# Patient Record
Sex: FEMALE | Race: WHITE
[De-identification: names, ages, dates, MRNs, and addresses within clinical notes are randomized per-mention and may not be internally consistent; named-entity substitution may affect disease eponyms.]

---

## 2017-10-06 ENCOUNTER — HOSPITAL ENCOUNTER (EMERGENCY)
Dept: HOSPITAL 61 - ER | Age: 22
LOS: 1 days | Discharge: HOME | End: 2017-10-07
Payer: COMMERCIAL

## 2017-10-06 VITALS — BODY MASS INDEX: 44.41 KG/M2 | WEIGHT: 293 LBS | HEIGHT: 68 IN

## 2017-10-06 DIAGNOSIS — E66.3: ICD-10-CM

## 2017-10-06 DIAGNOSIS — R11.2: Primary | ICD-10-CM

## 2017-10-06 DIAGNOSIS — Z88.2: ICD-10-CM

## 2017-10-06 DIAGNOSIS — M54.5: ICD-10-CM

## 2017-10-06 DIAGNOSIS — E11.9: ICD-10-CM

## 2017-10-06 DIAGNOSIS — R19.7: ICD-10-CM

## 2017-10-06 LAB
BACTERIA #/AREA URNS HPF: 0 /HPF
BILIRUB UR QL STRIP: NEGATIVE
GLUCOSE UR STRIP-MCNC: NEGATIVE MG/DL
NITRITE UR QL STRIP: NEGATIVE
PH UR STRIP: 6 [PH]
PROT UR STRIP-MCNC: 30 MG/DL
RBC #/AREA URNS HPF: >40 /HPF (ref 0–2)
SP GR UR STRIP: >=1.03
SQUAMOUS #/AREA URNS LPF: (no result) /LPF
UROBILINOGEN UR-MCNC: 0.2 MG/DL
WBC #/AREA URNS HPF: (no result) /HPF (ref 0–4)

## 2017-10-06 PROCEDURE — 81001 URINALYSIS AUTO W/SCOPE: CPT

## 2017-10-06 PROCEDURE — 81025 URINE PREGNANCY TEST: CPT

## 2017-10-06 PROCEDURE — 99285 EMERGENCY DEPT VISIT HI MDM: CPT

## 2017-10-06 PROCEDURE — 74176 CT ABD & PELVIS W/O CONTRAST: CPT

## 2017-10-06 NOTE — PHYS DOC
Adult General


Chief Complaint


Chief Complaint:  NAUSEA/VOMITING/DIARRHA





HPI


HPI





Patient is a 21  year old female with history of diabetes type 2 who presents 

today with nausea vomiting and slight diarrhea and right low back pain that 

began 3 days ago. Patient denies any fever. Denies any hematemesis or melena.





Review of Systems


Review of Systems





Constitutional: Denies fever or chills []


Eyes: Denies change in visual acuity, redness, or eye pain []


HENT: Denies nasal congestion or sore throat []


Respiratory: Denies cough or shortness of breath []


Cardiovascular: No additional information not addressed in HPI []


GI:  abdominal pain, nausea, vomiting, slight diarrhea []


: Denies dysuria or hematuria []


Musculoskeletal: Denies back pain or joint pain []


Integument: Denies rash or skin lesions []


Neurologic: Denies headache, focal weakness or sensory changes []


Endocrine: Denies polyuria or polydipsia []





Current Medications


Current Medications





Current Medications








 Medications


  (Trade)  Dose


 Ordered  Sig/Ra  Start Time


 Stop Time Status Last Admin


Dose Admin


 


 Acetaminophen/


 Hydrocodone Bitart


  (Lortab 5/325)  1 tab  1X  ONCE  10/6/17 23:00


 10/6/17 23:01 DC 10/6/17 23:31


1 TAB


 


 Cyclobenzaprine


 HCl


  (Flexeril)  10 mg  1X  ONCE  10/6/17 23:00


 10/6/17 23:01 DC 10/6/17 23:31


10 MG


 


 Dicyclomine HCl


  (Bentyl)  20 mg  1X  ONCE  10/6/17 23:00


 10/6/17 23:01 DC 10/6/17 23:30


20 MG


 


 Ondansetron HCl


  (Zofran Odt)  4 mg  1X  ONCE  10/6/17 23:00


 10/6/17 23:01 DC 10/6/17 23:30


4 MG











Allergies


Allergies





Allergies








Coded Allergies Type Severity Reaction Last Updated Verified


 


  Sulfa (Sulfonamide Antibiotics) Allergy Unknown  10/6/17 Yes











Physical Exam


Physical Exam





Constitutional: Well developed, well nourished, no acute distress, non-toxic 

appearance. []


HENT: Normocephalic, atraumatic, bilateral external ears normal, oropharynx 

moist, no oral exudates, nose normal. []


Eyes: PERRLA, EOMI, conjunctiva normal, no discharge. [] 


Neck: Normal range of motion, no tenderness, supple, no stridor. [] 


Cardiovascular:Heart rate regular rhythm, no murmur []


Lungs & Thorax:  Bilateral breath sounds clear to auscultation []


Abdomen: Overweight patient. Bowel sounds normal, soft, no tenderness, no masses

, no pulsatile masses. [] 


Skin: Warm, dry, no erythema, no rash. [] 


Back: Slight paraspinal muscle tenderness to the right lumbar spine, no midline 

lumbar spine tenderness, no CVA tenderness. [] 


Extremities: No tenderness, no cyanosis, no clubbing, ROM intact, no edema. [] 


Neurologic: Alert and oriented X 3, normal motor function, normal sensory 

function, no focal deficits noted. []


Psychologic: Affect normal, judgement normal, mood normal. []





Current Patient Data


Vital Signs





 Vital Signs








  Date Time  Temp Pulse Resp B/P (MAP) Pulse Ox O2 Delivery O2 Flow Rate FiO2


 


10/6/17 22:42 98.1 89 18  100 Room Air  





 98.1       








Lab Values





 Laboratory Tests








Test


  10/6/17


22:28 10/6/17


22:45


 


Urine Collection Type Unknown   


 


Urine Color Yellow   


 


Urine Clarity Clear   


 


Urine pH 6.0   


 


Urine Specific Gravity >=1.030   


 


Urine Protein


  30 mg/dL


(NEG-TRACE) 


 


 


Urine Glucose (UA)


  Negative mg/dL


(NEG) 


 


 


Urine Ketones (Stick)


  Negative mg/dL


(NEG) 


 


 


Urine Blood Large (NEG)   


 


Urine Nitrite


  Negative (NEG)


  


 


 


Urine Bilirubin


  Negative (NEG)


  


 


 


Urine Urobilinogen Dipstick


  0.2 mg/dL (0.2


mg/dL) 


 


 


Urine Leukocyte Esterase


  Negative (NEG)


  


 


 


Urine RBC


  >40 /HPF (0-2)


  


 


 


Urine WBC


  Occ /HPF (0-4)


  


 


 


Urine Squamous Epithelial


Cells Few /LPF  


  


 


 


Urine Bacteria


  0 /HPF (0-FEW)


  


 


 


Urine Mucus Mod /LPF   


 


POC Urine HCG, Qualitative


  


  Hcg negative


(Negative)











EKG


EKG


[]





Radiology/Procedures


Radiology/Procedures


[]





Course & Med Decision Making


Course & Med Decision Making


Pertinent Labs and Imaging studies reviewed. (See chart for details)





This is a 21-year-old female patient presenting today with diarrhea nausea 

vomiting that began 3 days ago. Patient Is also complaining of right low back 

pain. Urine analysis is negative for infection. CT of the abdomen and pelvic 

was negative for any acute findings. Patient was noted to have fatty liver. Her 

diarrhea or vomiting and nausea probably viral. Recommended she pushes fluids, 

maintains good hand hygiene. Discharged with Zofran, dicyclomine. Follow-up 

with primary care doctor next week.





Dragon Disclaimer


Dragon Disclaimer


This electronic medical record was generated, in whole or in part, using a 

voice recognition dictation system.





Departure


Departure


Impression:  


 Primary Impression:  


 Nausea & vomiting


 Additional Impression:  


 Diarrhea


Disposition:  01 HOME, SELF-CARE


Condition:  STABLE


Referrals:  


KORINA GARZON MD


follow up in one week


Patient Instructions:  Diarrhea, Easy-to-Read, Nausea and Vomiting





Additional Instructions:  


You were seen for diarrhea nausea and vomiting. This are typical viral illness 

symptoms. Push fluids, maintain good hand hygiene. Take the prescribed 

medicines as ordered. Follow-up with your doctor next week, come back to the ED 

if symptoms worsen.


Scripts


Ondansetron (ZOFRAN ODT) 4 Mg Tab.rapdis


1 TAB SL Q8HRS, #15 TAB


   Prov: AMBER MCINTYRE         10/7/17 


Dicyclomine Hcl (DICYCLOMINE HCL) 20 Mg Tablet


1 TAB PO TID, #30 TAB 1 Refill


   Prov: ABMER MCINTYRE         10/7/17





Problem Qualifiers








 Primary Impression:  


 Nausea & vomiting


 Vomiting type:  unspecified  Vomiting Intractability:  non-intractable  

Qualified Codes:  R11.2 - Nausea with vomiting, unspecified


 Additional Impression:  


 Diarrhea


 Diarrhea type:  unspecified type  Qualified Codes:  R19.7 - Diarrhea, 

unspecified








AMBER MCINTYRE Oct 6, 2017 22:51

## 2017-10-07 VITALS — SYSTOLIC BLOOD PRESSURE: 142 MMHG | DIASTOLIC BLOOD PRESSURE: 94 MMHG

## 2017-10-07 NOTE — RAD
CT abdomen and pelvis without contrast:

 

Reason for examination: Low back pain for 3 days. Right flank pain. 

Nausea, vomiting and diarrhea.

 

Helical images were obtained through the abdomen and pelvis with no 

intravenous or oral contrast administered. Reconstruction was performed in

sagittal and coronal planes.

 

Exposure: One or more of the following individualized dose reduction 

techniques were utilized for this examination:  1. Automated exposure 

control  2. Adjustment of the mA and/or kV according to patient size  3. 

Use of iterative reconstruction technique.

 

The lung bases are clear. The heart size is normal with no pericardial 

effusion seen.

 

The liver is enlarged at 23.6 cm and shows diffuse fatty infiltration 

without a focal lesion. The spleen is borderline enlarged at 13.1 cm 

without a focal lesion. No abnormalities are seen at the adrenal glands, 

pancreas or gallbladder. The abdominal aorta and inferior vena cava show 

no gross abnormalities. No abnormality seen at the appendix. There are a 

few diverticuli in the sigmoid colon but no evidence of diverticulitis. 

The small intestinal tract shows no abnormally dilated loops of bowel or 

bowel obstruction. The kidneys show no renal masses, renal calculi, 

hydronephrosis or evidence of obstructive uropathy.

 

The bladder is not distended. No abnormality seen at the uterus or 

ovaries. No free fluid or free air seen in the abdomen or pelvis.

 

IMPRESSION:

 

Enlarged liver at 23.6 cm with diffuse fatty infiltration. Spleen is 

borderline enlarged at 13.1 cm. No other focal abnormality seen in the 

abdomen or pelvis.

 

Electronically signed by: Candie Aguirre MD (10/7/2017 12:08 AM) [id number]

## 2018-05-09 ENCOUNTER — HOSPITAL ENCOUNTER (EMERGENCY)
Dept: HOSPITAL 61 - ER | Age: 23
LOS: 1 days | Discharge: HOME | End: 2018-05-10
Payer: COMMERCIAL
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
## 2018-05-09 DIAGNOSIS — Z88.2: ICD-10-CM
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
## 2018-05-09 DIAGNOSIS — E11.9: ICD-10-CM
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
## 2018-05-09 DIAGNOSIS — S63.91XA: Primary | ICD-10-CM

## 2018-05-09 PROCEDURE — 73130 X-RAY EXAM OF HAND: CPT

## 2018-05-09 PROCEDURE — 99284 EMERGENCY DEPT VISIT MOD MDM: CPT

## 2021-03-13 ENCOUNTER — HOSPITAL ENCOUNTER (EMERGENCY)
Dept: HOSPITAL 61 - ER | Age: 26
Discharge: HOME | End: 2021-03-13
Payer: SELF-PAY

## 2021-03-13 VITALS — WEIGHT: 293 LBS | BODY MASS INDEX: 43.4 KG/M2 | HEIGHT: 69 IN

## 2021-03-13 VITALS — DIASTOLIC BLOOD PRESSURE: 61 MMHG | SYSTOLIC BLOOD PRESSURE: 125 MMHG

## 2021-03-13 DIAGNOSIS — Z90.89: ICD-10-CM

## 2021-03-13 DIAGNOSIS — R10.2: ICD-10-CM

## 2021-03-13 DIAGNOSIS — N76.2: Primary | ICD-10-CM

## 2021-03-13 DIAGNOSIS — L53.9: ICD-10-CM

## 2021-03-13 DIAGNOSIS — Z88.2: ICD-10-CM

## 2021-03-13 DIAGNOSIS — E11.9: ICD-10-CM

## 2021-03-13 DIAGNOSIS — R60.0: ICD-10-CM

## 2021-03-13 PROCEDURE — 81025 URINE PREGNANCY TEST: CPT

## 2021-03-13 PROCEDURE — 99283 EMERGENCY DEPT VISIT LOW MDM: CPT

## 2021-03-13 RX ADMIN — CLINDAMYCIN HYDROCHLORIDE ONE MG: 150 CAPSULE ORAL at 01:48

## 2021-03-14 ENCOUNTER — HOSPITAL ENCOUNTER (EMERGENCY)
Dept: HOSPITAL 61 - ER | Age: 26
Discharge: HOME | End: 2021-03-14
Payer: SELF-PAY

## 2021-03-14 VITALS — SYSTOLIC BLOOD PRESSURE: 147 MMHG | DIASTOLIC BLOOD PRESSURE: 67 MMHG

## 2021-03-14 VITALS — HEIGHT: 69 IN | WEIGHT: 293 LBS | BODY MASS INDEX: 43.4 KG/M2

## 2021-03-14 DIAGNOSIS — N75.1: Primary | ICD-10-CM

## 2021-03-14 DIAGNOSIS — Z98.890: ICD-10-CM

## 2021-03-14 DIAGNOSIS — E11.9: ICD-10-CM

## 2021-03-14 DIAGNOSIS — Z90.89: ICD-10-CM

## 2021-03-14 DIAGNOSIS — Z88.2: ICD-10-CM

## 2021-03-14 PROCEDURE — 90715 TDAP VACCINE 7 YRS/> IM: CPT

## 2021-03-14 PROCEDURE — 90471 IMMUNIZATION ADMIN: CPT

## 2021-03-14 PROCEDURE — 56420 I&D BARTHOLINS GLAND ABSCESS: CPT

## 2021-03-14 PROCEDURE — 99284 EMERGENCY DEPT VISIT MOD MDM: CPT

## 2021-03-14 NOTE — PHYS DOC
Past Medical History


Past Medical History:  Diabetes-Type II, Other


Additional Past Medical Histor:  herpes


Past Surgical History:  Tonsillectomy


Smoking Status:  Never Smoker


Alcohol Use:  None


Drug Use:  None





General Adult


EDM:


Chief Complaint:  ABSCESS





HPI:


HPI:





Patient is a 25  year old female with history of diabetes type 2 presented to 

the ED today complaining of an abscess on the left labia that she has had it si

nce last week roughly 5 days.  Patient states she is in the ED 3 days ago and 

was discharged home on clindamycin.  She states it is not helping.  She states 

the abscess has grown bigger.  Denies any fever, nausea vomiting.





Review of Systems:


Review of Systems:


Constitutional:   Denies fever or chills. []


Musculoskeletal:   Denies back pain or joint pain. [] 


Integument:   Reports left labia abscess


Neurologic:   Denies headache, focal weakness or sensory changes. [] 


Psychiatric:  Denies depression or anxiety. []





Heart Score:


C/O Chest Pain:  No


Risk Factors:


Risk Factors:  DM, Current or recent (<one month) smoker, HTN, HLP, family 

history of CAD, obesity.


Risk Scores:


Score 0 - 3:  2.5% MACE over next 6 weeks - Discharge Home


Score 4 - 6:  20.3% MACE over next 6 weeks - Admit for Clinical Observation


Score 7 - 10:  72.7% MACE over next 6 weeks - Early Invasive Strategies





Allergies:


Allergies:





Allergies








Coded Allergies Type Severity Reaction Last Updated Verified


 


  Sulfa (Sulfonamide Antibiotics) Allergy Intermediate  3/13/21 Yes











Physical Exam:


PE:





Constitutional: Well developed, well nourished, no acute distress, non-toxic 

appearance. []





Skin: Left labia majora with mild swelling consistent of a Bartholin.  The area 

is firm, warm and tender to touch.


Back: No tenderness, no CVA tenderness. [] 


Extremities: No tenderness, no cyanosis, no clubbing, ROM intact, no edema. [] 


Neurologic: Alert and oriented X 3, normal motor function, normal sensory 

function, no focal deficits noted. []


Psychologic: Affect normal, judgement normal, mood normal. []





Current Patient Data:


Vital Signs:





                                   Vital Signs








  Date Time  Temp Pulse Resp B/P (MAP) Pulse Ox O2 Delivery O2 Flow Rate FiO2


 


3/14/21 13:00 97.6 120 16 147/67 (93) 99 Room Air  





 97.6       











EKG:


EKG:


[]





Radiology/Procedures:


Radiology/Procedures:


Indication: Left labia majora Bartholin cyst





Procedure: The patient was positioned appropriately. Local anesthesia was 1% of 

lidocaine mixed with 0.5% of Bupivicaine.  An incision was then made over the 

apex of the lesion with an 11 blade and moderate amount of bloody yellow 

material was expressed. The drainage cavity was irrigated and word catheter  

inflated with 5cc of air applied in the cavity. The patients tetanus status 

updated as needed. 





The patient tolerated the procedure well.





Complications: none.





Course & Med Decision Making:


Course & Med Decision Making


Pertinent Labs and Imaging studies reviewed. (See chart for details)





This is a 25-year-old female patient presented to the ED today with a Bartholin 

cyst of the left labia that she has had it for 5 days.  She was in the ED 3 days

 ago and was started with antibiotics.  Patient reports no relief from the 

antibiotics, she states the area has gotten bigger.





Physical exam is consistent with a Bartholin cyst which I drained in the 

emergency room and Bartholin Word catheter applied.  Instructed patient to 

return to the ED in 3 days for catheter removal.  Tetanus updated.





Dragon Disclaimer:


Dragon Disclaimer:


This electronic medical record was generated, in whole or in part, using a voice

 recognition dictation system.





Departure


Departure


Impression:  


   Primary Impression:  


   Cyst of left Bartholin's gland


Disposition:  01 DC HOME SELF CARE/HOMELESS


Condition:  STABLE


Referrals:  


NO PCP (PCP)


follow up with the ER on Wednesday between 11am-11pm


Patient Instructions:  Bartholin's Cyst and Abscess-Brief





Additional Instructions:  


You have a Bartholin cyst that was drained in the emergency room.  You have a 

Bartholin cyst catheter in your left labia to keep it open and draining.  You 

can shower and wash your vagina as you normally do.  Consider applying warm 

compresses to the vagina once to twice a day.  Please take the prescribed pain 

medicine as needed.  Please come back in the emergency room on Wednesday for 

catheter removal


Scripts


Hydrocodone/Acetaminophen (Hydrocodone-Acetamin 5-325 mg) 1 Each Tablet


1 EACH PO Q6HRS PRN for PAIN, #20 TAB


   Prov: AMBER MCINTYRE         3/14/21











AMBER MCINTYRE              Mar 14, 2021 13:25

## 2021-03-17 ENCOUNTER — HOSPITAL ENCOUNTER (EMERGENCY)
Dept: HOSPITAL 61 - ER | Age: 26
Discharge: HOME | End: 2021-03-17
Payer: SELF-PAY

## 2021-03-17 VITALS — WEIGHT: 293 LBS | BODY MASS INDEX: 43.4 KG/M2 | HEIGHT: 69 IN

## 2021-03-17 VITALS — SYSTOLIC BLOOD PRESSURE: 145 MMHG | DIASTOLIC BLOOD PRESSURE: 83 MMHG

## 2021-03-17 DIAGNOSIS — Z88.2: ICD-10-CM

## 2021-03-17 DIAGNOSIS — N75.0: Primary | ICD-10-CM

## 2021-03-17 DIAGNOSIS — E11.9: ICD-10-CM

## 2021-03-17 PROCEDURE — 99281 EMR DPT VST MAYX REQ PHY/QHP: CPT

## 2021-03-17 NOTE — PHYS DOC
Past Medical History


Past Medical History:  Diabetes-Type II, Other


Additional Past Medical Histor:  herpes


Past Surgical History:  Tonsillectomy


Smoking Status:  Never Smoker


Alcohol Use:  None


Drug Use:  None





General Adult


EDM:


Chief Complaint:  WOUND CHECK





HPI:


HPI:





Patient is a 25  year old female who presents the ED today for wound check and 

word catheter removal for Bartholin cyst that was drained 3 days ago.  Denies 

any fever.





Review of Systems:


Review of Systems:


Constitutional:   Denies fever or chills. []


Musculoskeletal:   Denies back pain or joint pain. [] 


Integument:   Visit for wound check and catheter removal


Neurologic:   Denies headache, focal weakness or sensory changes. [] 


Psychiatric:  Denies depression or anxiety. []





Heart Score:


C/O Chest Pain:  N/A


Risk Factors:


Risk Factors:  DM, Current or recent (<one month) smoker, HTN, HLP, family 

history of CAD, obesity.


Risk Scores:


Score 0 - 3:  2.5% MACE over next 6 weeks - Discharge Home


Score 4 - 6:  20.3% MACE over next 6 weeks - Admit for Clinical Observation


Score 7 - 10:  72.7% MACE over next 6 weeks - Early Invasive Strategies





Current Medications:





Current Medications








 Medications


  (Trade)  Dose


 Ordered  Sig/Ra  Start Time


 Stop Time Status Last Admin


Dose Admin


 


 Lidocaine HCl


  (Lidocaine 1%


 20ml Vial)  20 ml  1X  ONCE  3/17/21 19:00


 3/17/21 19:02 DC  














Allergies:


Allergies:





Allergies








Coded Allergies Type Severity Reaction Last Updated Verified


 


  Sulfa (Sulfonamide Antibiotics) Allergy Intermediate  3/13/21 Yes











Physical Exam:


PE:





Constitutional: Well developed, well nourished, no acute distress, non-toxic 

appearance. []


Skin: Left labia majora with a word Bartholin cyst catheter, there is still mild

 drainage coming out of the cyst though the induration has gone down 

tremendously.  No erythema.  I recommended she returns in a couple days for 

wound check and catheter removal


Back: No tenderness, no CVA tenderness. [] 


Extremities: No tenderness, no cyanosis, no clubbing, ROM intact, no edema. [] 


Neurologic: Alert and oriented X 3, normal motor function, normal sensory 

function, no focal deficits noted. []


Psychologic: Affect normal, judgement normal, mood normal. []





Current Patient Data:


Vital Signs:





                                   Vital Signs








  Date Time  Temp Pulse Resp B/P (MAP) Pulse Ox O2 Delivery O2 Flow Rate FiO2


 


3/17/21 19:30 98.3 105 18 145/83 (103) 97 Room Air  





 98.3       











EKG:


EKG:


[]





Radiology/Procedures:


Radiology/Procedures:


[]





Course & Med Decision Making:


Course & Med Decision Making


Pertinent Labs and Imaging studies reviewed. (See chart for details)





This is a 25-year-old female patient presented to the ED today for wound check 

and word catheter removal from left labia from a Bartholin cyst.  The cyst was 

drained 3 days ago.  There is still mild drainage coming from the labia.  I 

recommended she keeps the catheter in for couple more days.  Is supposed to go 

to a wedding in [place].  She will be back in the ED on Monday for catheter 

removal.





Dragon Disclaimer:


Dragon Disclaimer:


This electronic medical record was generated, in whole or in part, using a voice

 recognition dictation system.





Departure


Departure


Impression:  


   Primary Impression:  


   Cyst of left Bartholin's gland


   Additional Impression:  


   Visit for wound check


Disposition:  01 DC HOME SELF CARE/HOMELESS


Condition:  STABLE


Referrals:  


NO PCP (PCP)


follow up in one week


Patient Instructions:  Bartholin's Cyst and Abscess-Brief, Wound Check





Additional Instructions:  


You have a Bartholin cyst word catheter in your labia.  Continue applying warm 

compresses to the area.  You can shower with soap and water.  Please come back 

to the ED as discussed on Monday for catheter removal and wound check











AMBER MCINTYRE              Mar 17, 2021 20:46

## 2022-08-26 NOTE — ED.ADGEN
Past Medical History


Past Medical History:  Diabetes-Type II


Past Surgical History:  Tonsillectomy


Smoking Status:  Never Smoker


Alcohol Use:  None


Drug Use:  None





General Adult


EDM:


Chief Complaint:  VAGINAL PROBLEM





HPI:


HPI:





Patient is a 25  year old female coming in for pain and swelling to her left 

labia.  Is happy with past couple days.  She thinks is associated with wearing 

some tight jeans.  Has had ingrown hairs before.  No drainage from the area.  No

systemic complaints.  Denies any vaginal bleeding or discharge.  LMP 1 week ago





Review of Systems:


Review of Systems:


All other systems within normal limits except for as noted in the HPI





Current Medications:





Current Medications








 Medications


  (Trade)  Dose


 Ordered  Sig/Ra  Start Time


 Stop Time Status Last Admin


Dose Admin


 


 Clindamycin HCl


  (Cleocin)  300 mg  1X  ONCE  3/13/21 02:00


 3/13/21 02:01 DC 3/13/21 01:48


300 MG











Allergies:


Allergies:





Allergies








Coded Allergies Type Severity Reaction Last Updated Verified


 


  Sulfa (Sulfonamide Antibiotics) Allergy Intermediate  3/13/21 Yes











Physical Exam:


PE:





Constitutional: Well developed, well nourished, no acute distress, non-toxic 

appearance. []


HENT: Normocephalic, atraumatic, bilateral external ears normal, oropharynx mois

t, no oral exudates, nose normal. []


Eyes: PERRLA, EOMI, conjunctiva normal, no discharge. [] 


Neck: Normal range of motion, no tenderness, supple, no stridor. [] 


Cardiovascular:Heart rate regular rhythm, no murmur []


Lungs & Thorax:  Bilateral breath sounds clear to auscultation []


Abdomen: Bowel sounds normal, soft, no tenderness, no masses, no pulsatile 

masses. [] 


Skin: Warm, dry, no erythema, no rash.  Right labia erythematous without 

fluctuance or palpable fluid collection.  [] 


Back: No tenderness, no CVA tenderness. [] 


Extremities: No tenderness, no cyanosis, no clubbing, ROM intact, no edema. [] 


Neurologic: Alert and oriented X 3, normal motor function, normal sensory 

function, no focal deficits noted. []


Psychologic: Affect normal, judgement normal, mood normal. []





Current Patient Data:


Labs:





                                Laboratory Tests








Test


 3/13/21


01:06


 


POC Urine HCG, Qualitative


 Hcg negative


(Negative)








Vital Signs:





                                   Vital Signs








  Date Time  Temp Pulse Resp B/P (MAP) Pulse Ox O2 Delivery O2 Flow Rate FiO2


 


3/13/21 02:00  106  125/61 (82) 96 Room Air  


 


3/13/21 01:28 98.1  16     





 98.1       











EKG:


EKG:


[]





Heart Score:


C/O Chest Pain:  No


Risk Factors:


Risk Factors:  DM, Current or recent (<one month) smoker, HTN, HLP, family 

history of CAD, obesity.


Risk Scores:


Score 0 - 3:  2.5% MACE over next 6 weeks - Discharge Home


Score 4 - 6:  20.3% MACE over next 6 weeks - Admit for Clinical Observation


Score 7 - 10:  72.7% MACE over next 6 weeks - Early Invasive Strategies





Radiology/Procedures:


Radiology/Procedures:


[]





Course & Med Decision Making:


Course & Med Decision Making


Patient consistent with cellulitis, discussed return precautions if abscess 

forms.





Dragon Disclaimer:


Dragon Disclaimer:


This electronic medical record was generated, in whole or in part, using a voice

 recognition dictation system.





Departure


Departure


Impression:  


   Primary Impression:  


   Infection of labia


Disposition:  01 DC HOME SELF CARE/HOMELESS


Condition:  STABLE


Referrals:  


NEIL LOONEY (PCP)


Patient Instructions:  Cellulitis





Additional Instructions:  


Can use Tucks medicated pads to alleviate pain, warm compresses as needed.


Scripts


Fluconazole (DIFLUCAN) 150 Mg Tablet


1 TAB PO WEEKLY for antifungal for 2 Days, #2 TAB 1 Refill


   Prov: BRADLY SANCHEZ MD         3/13/21 


Clindamycin Hcl (CLINDAMYCIN HCL) 300 Mg Capsule


1 CAP PO QID for antibiotic for 7 Days, #28 CAP


   Prov: BRADLY SANCHEZ MD         3/13/21











BRADLY SANCHEZ MD              Mar 13, 2021 01:51 Not applicable